# Patient Record
Sex: FEMALE | Race: WHITE
[De-identification: names, ages, dates, MRNs, and addresses within clinical notes are randomized per-mention and may not be internally consistent; named-entity substitution may affect disease eponyms.]

---

## 2017-05-17 ENCOUNTER — HOSPITAL ENCOUNTER (EMERGENCY)
Dept: HOSPITAL 10 - FTE | Age: 52
Discharge: HOME | End: 2017-05-17
Payer: COMMERCIAL

## 2017-05-17 VITALS
BODY MASS INDEX: 23.94 KG/M2 | WEIGHT: 130.07 LBS | HEIGHT: 62 IN | BODY MASS INDEX: 23.94 KG/M2 | HEIGHT: 62 IN | WEIGHT: 130.07 LBS

## 2017-05-17 DIAGNOSIS — H66.91: Primary | ICD-10-CM

## 2017-05-17 DIAGNOSIS — R05: ICD-10-CM

## 2017-05-17 PROCEDURE — 99283 EMERGENCY DEPT VISIT LOW MDM: CPT

## 2017-05-17 NOTE — ERD
ER Documentation


Chief Complaint


Date/Time


DATE: 5/17/17 


TIME: 17:17


Chief Complaint


RT EAR PAIN W/ DRAINAGE SINCE LAST NIGHT.





HPI


51-year-old female has had a cough for the past 2 days as well as right ear 

pain with drainage that started last night.  Patient describes achy pain in the 

inner ear on the right side, nonradiating.  She denies any fevers or chills.





ROS


All systems reviewed and are negative except as per history of present illness.





Medications


Home Meds


Active Scripts


Guaifenesin/Codeine Phosphate (CHERATUSSIN AC SYRUP) 118 Ml Liquid, 5 ML PO Q4H 

Y for COUGH, #118 ML


   Prov:DARSHANA MCKEON PA-C         5/17/17


Ofloxacin Otic (Ofloxacin Otic) 5 Ml Drops, 5 DROP RIGHT EAR BID for 10 Days, #

1 BOTTLE


   Prov:DARSHANA MCKEON PA-C         5/17/17


Amoxicillin/Potassium Clav (Amox-Clav 875-125 mg Tablet) 875-125 mg Tab, 1 TAB 

PO BID for 7 Days, #14 TAB


   Prov:DARSHANA MCKEON PA-C         5/17/17





Allergies


Allergies:  


Coded Allergies:  


     No Known Allergy (Unverified , 12/1/14)





PMhx/Soc


Medical and Surgical Hx:  pt denies Medical Hx, pt denies Surgical Hx


Hx Alcohol Use:  No


Hx Substance Use:  No


Hx Tobacco Use:  No


Smoking Status:  Never smoker





Physical Exam


Vitals





Vital Signs








  Date Time  Temp Pulse Resp B/P Pulse Ox O2 Delivery O2 Flow Rate FiO2


 


5/17/17 15:56 98.7 74 20 139/74 98   








Physical Exam


General: Well-developed, well-nourished.  The patient appears in no acute 

distress.


HEENT: Head is normocephalic, atraumatic. No scleral icterus.  Pupils are equal

, round, and reactive. Oral mucous membranes are moist.  No pharyngeal 

erythema.  TM on the right side is obscured, the ear canal is edematous, there 

is drainage, no otorrhea.  Left ear is unremarkable


Neck: Supple.  Nontender.  Mastoids are nontender.


Lungs: Clear to auscultation.  Normal air movement.


Heart: Regular rate and rhythm.  S1 and S2 are normal.  No murmurs, gallops, or 

rubs.


Abdomen: Soft, nontender, nondistended.  Bowel sounds are normoactive.


Extremities: No clubbing or cyanosis.  Normal pulses. Moving extremities x 4. 

No weakness.


Neurologic: Alert and oriented 3.  No focal deficits.


Skin: Normal turgor.  No rash or lesions.





Procedures/MDM


MDM: The patient is a 51-year-old female who comes in with an acute upper 

respiratory infection, presumed viral, otitis media with perforation. The 

patient has a differential diagnosis of a viral upper respiratory infection, 

bacterial upper respiratory infection, bronchitis, pneumonia, pharyngitis, 

laryngitis, epiglottitis, croup, pneumonia. Patient has a normal pulmonary 

examination, clear breath sounds, normal pulse oximetry, with no corrective 

measures needed at this time. Fluids, rest, antipyretics were encouraged.





Departure


Diagnosis:  


 Primary Impression:  


 Otitis media


 Additional Impression:  


 Cough


Condition:  Good


Patient Instructions:  Otitis Media, Abx Tx (Adult), Uri, Viral, No Abx (Adult)





Additional Instructions:  


Llame al doctor MAANA y chuy karen SEGUN PARA DENTRO DE 1-2 KENT.Dgale a la 

secretaria que nosotros le instruimos hacer esta segun.Avise o llame si pereira 

condicin se empeora antes de la segun. Regresa aqui si peor o no mejor.











DARSHANA MCKEON PA-C May 17, 2017 17:19

## 2017-06-05 ENCOUNTER — HOSPITAL ENCOUNTER (EMERGENCY)
Dept: HOSPITAL 10 - E/R | Age: 52
Discharge: LEFT BEFORE DISCHARGE | End: 2017-06-05
Payer: SELF-PAY

## 2017-06-05 DIAGNOSIS — Z53.21: Primary | ICD-10-CM

## 2017-06-06 ENCOUNTER — HOSPITAL ENCOUNTER (EMERGENCY)
Dept: HOSPITAL 10 - E/R | Age: 52
Discharge: HOME | End: 2017-06-06
Payer: COMMERCIAL

## 2017-06-06 VITALS — WEIGHT: 138.89 LBS | HEIGHT: 55 IN | BODY MASS INDEX: 32.14 KG/M2

## 2017-06-06 DIAGNOSIS — H65.01: Primary | ICD-10-CM

## 2017-06-06 PROCEDURE — 99283 EMERGENCY DEPT VISIT LOW MDM: CPT

## 2017-06-06 NOTE — ERD
ER Documentation


Chief Complaint


Date/Time


DATE: 6/6/17 


TIME: 14:25


Chief Complaint


R EAR PAIN , TREATED FOR SAME 3 WKS AGO, NO COUGH OR FEVERS NOTED





HPI


51-year-old female complaining of right ear problem 3 weeks.  Patient was seen 

here initially on 5/17/2017 for ear pain and viral upper respiratory symptoms.  

She was given prescription of Augmentin and ofloxacin otic.  Patient stated 

that her ear pain has improved, however she still has decreased hearing in the 

right ear along with tinnitus.  Denies nasal congestion.  Denies ear drainage.  

Denies fever or chills.  Denies jaw pain.





ROS


All systems reviewed and are negative except as per history of present illness.





Medications


Home Meds


Active Scripts


Fluticasone Propionate (Flonase Allergy Relief) 9.9 Ml [place].susp, 1 SPRAY 

NASAL DAILY, #1 BOTTLE


   TO EACH NOSTRIL


   Prov:NAFISA LU. NP         6/6/17


Sodium Chloride (Saline Nasal Mist) 126 Ml Mist, 2 SPRAY NASAL Q2H Y for NASAL 

CONGESTION, #1 BOTTLE


   Prov:NAFISA LU NP         6/6/17


Guaifenesin/Codeine Phosphate (CHERATUSSIN AC SYRUP) 118 Ml Liquid, 5 ML PO Q4H 

Y for COUGH, #118 ML


   Prov:DARSHANA MCKEON PA-C         5/17/17


Ofloxacin Otic (Ofloxacin Otic) 5 Ml Drops, 5 DROP RIGHT EAR BID for 10 Days, #

1 BOTTLE


   Prov:DARSHANA MCKEON PA-C         5/17/17


Amoxicillin/Potassium Clav (Amox-Clav 875-125 mg Tablet) 875-125 mg Tab, 1 TAB 

PO BID for 7 Days, #14 TAB


   Prov:DARSHANA MCKEON PA-C         5/17/17





Allergies


Allergies:  


Coded Allergies:  


     No Known Allergy (Unverified , 12/1/14)





PMhx/Soc


Medical and Surgical Hx:  pt denies Medical Hx


Hx Alcohol Use:  No


Hx Substance Use:  No


Hx Tobacco Use:  No





Physical Exam


Vitals





Vital Signs








  Date Time  Temp Pulse Resp B/P Pulse Ox O2 Delivery O2 Flow Rate FiO2


 


6/6/17 13:18 97.6 77 21 174/84 98   








Physical Exam


General:    Well-developed, well-nourished, conscious and coherent, in no 

distress


Skin:    Warm and dry without rash, good texture and turgor


Head:    Normocephalic without evidence of trauma


Eyes:    Sclera and conjunctivae normal; pupils equal, round, and reactive to 

light; extraocular movements are intact


Ears:    Left ear canal is large amount cerumen, unable to visualize TM on the 

left.  Right canal is patent, tympanic membrane dull in appearance.


Nose/Face:    Nasal mucosa swollen, without rhinorrhea.


Mouth/throat:    Mucous membranes are moist.  Posterior pharynx clear without 

erythema or exudates


Neck:    Supple without meningismus or adenopathy.  Right sternocleidomastoid 

tenderness on palpation.  Carotids are equal.  Trachea midline.  No bruits or 

JVD


Chest:    Normal AP diameter.  Good expansion without retractions.  Nontender.  

Lungs are clear to auscultate bilaterally with good tidal volume


Heart:    Regular rate and rhythm.  No murmur, rub, or gallops heard


Extremities:    Full range of motion.  Good strength bilaterally.  No clubbing, 

cyanosis, or edema. Peripheral pulses are intact. Sensation intact


Neuro:    Alert and oriented 4, GCS 15.  Cranial nerves grossly intact.  Motor 

and sensory exams nonfocal.  Moves all extremities.  Speech clear.  Gait normal





Procedures/MDM


Well-appearing 51-year-old female presented ED with continued decreased hearing 

in the right ear after antibiotic treatment for acute otitis media and otitis 

externa.  No sign of suppurative acute otitis media or otitis externa remains.  

Patient symptoms exam findings consistent with serous otitis media secondary to 

eustachian tube dysfunction due to nasal congestion.  Patient appears well, 

stable for discharge and outpatient management. Medical decision making shared 

with patient and family. Education provided to patient and family. Patient and 

family expressed understanding of the plan.





Medications on discharge: Saline nasal spray, Flonase.


Follow-up: Primary care provider in 2-3 days or return to ED if worse.





Departure


Diagnosis:  


 Primary Impression:  


 Serous otitis media


 Laterality:  right  Chronicity:  acute  Recurrence:  not specified as 

recurrent  Qualified Code:  H65.01 - Right acute serous otitis media, 

recurrence not specified


Condition:  Good


Patient Instructions:  Serous Otitis Media Without Infection [Child]


Referrals:  


[facility]


YOU HAVE RECEIVED A MEDICAL SCREENING EXAM AND THE RESULTS INDICATE THAT YOU DO 

NOT HAVE A CONDITION THAT REQUIRES URGENT TREATMENT IN THE EMERGENCY DEPARTMENT.





FURTHER EVALUATION AND TREATMENT OF YOUR CONDITION CAN WAIT UNTIL YOU ARE SEEN 

IN YOUR DOCTORS OFFICE WITHIN THE NEXT 1-2 DAYS. IT IS YOUR RESPONSIBILITY TO 

MAKE AN APPOINTMENT FOR FOLOW-UP CARE.





IF YOU HAVE A PRIMARY DOCTOR


--you should call your primary doctor and schedule an appointment





IF YOU DO NOT HAVE A PRIMARY DOCTOR YOU CAN CALL OUR PHYSICIAN REFERRAL HOTLINE 

AT


 [phone] 





IF YOU CAN NOT AFFORD TO SEE A PHYSICIAN YOU CAN CHOSE FROM THE FOLLOWING 

[facility]





[facility] [phone] [address] [facility]. [facility] [phone] [address] [facility]. [facility] [phone] [address] [facility] [phone] [address] [facility]. [facility] [phone] [address] [facility]. [facility]. [phone] [address]





Additional Instructions:  


Call your primary care doctor TOMORROW for an appointment during the next 2-3 

days.See the doctor sooner or return here if your condition worsens before your 

appointment time.











NAFISA LU NP Jun 6, 2017 14:31

## 2018-04-02 ENCOUNTER — HOSPITAL ENCOUNTER (EMERGENCY)
Dept: HOSPITAL 91 - FTE | Age: 53
Discharge: HOME | End: 2018-04-02
Payer: COMMERCIAL

## 2018-04-02 ENCOUNTER — HOSPITAL ENCOUNTER (EMERGENCY)
Age: 53
Discharge: HOME | End: 2018-04-02

## 2018-04-02 DIAGNOSIS — H57.8: Primary | ICD-10-CM

## 2018-04-02 PROCEDURE — 99283 EMERGENCY DEPT VISIT LOW MDM: CPT
